# Patient Record
Sex: MALE | Race: WHITE | Employment: FULL TIME | URBAN - NONMETROPOLITAN AREA
[De-identification: names, ages, dates, MRNs, and addresses within clinical notes are randomized per-mention and may not be internally consistent; named-entity substitution may affect disease eponyms.]

---

## 2019-09-10 ENCOUNTER — OFFICE VISIT (OUTPATIENT)
Dept: FAMILY MEDICINE | Facility: OTHER | Age: 59
End: 2019-09-10

## 2019-09-10 VITALS
OXYGEN SATURATION: 97 % | RESPIRATION RATE: 18 BRPM | TEMPERATURE: 98.2 F | WEIGHT: 245.1 LBS | DIASTOLIC BLOOD PRESSURE: 70 MMHG | SYSTOLIC BLOOD PRESSURE: 126 MMHG | HEIGHT: 73 IN | HEART RATE: 48 BPM | BODY MASS INDEX: 32.49 KG/M2

## 2019-09-10 DIAGNOSIS — Z23 VACCINE FOR DIPHTHERIA-TETANUS: ICD-10-CM

## 2019-09-10 DIAGNOSIS — S69.91XA FISH HOOK INJURY OF FINGER OF RIGHT HAND, INITIAL ENCOUNTER: Primary | ICD-10-CM

## 2019-09-10 PROCEDURE — 99202 OFFICE O/P NEW SF 15 MIN: CPT | Mod: 25 | Performed by: NURSE PRACTITIONER

## 2019-09-10 PROCEDURE — 10120 INC&RMVL FB SUBQ TISS SMPL: CPT | Performed by: NURSE PRACTITIONER

## 2019-09-10 PROCEDURE — 90715 TDAP VACCINE 7 YRS/> IM: CPT | Performed by: NURSE PRACTITIONER

## 2019-09-10 PROCEDURE — 90471 IMMUNIZATION ADMIN: CPT | Performed by: NURSE PRACTITIONER

## 2019-09-10 ASSESSMENT — ENCOUNTER SYMPTOMS
JOINT SWELLING: 0
ARTHRALGIAS: 0
WOUND: 1
COLOR CHANGE: 0
CONSTITUTIONAL NEGATIVE: 1
NEUROLOGICAL NEGATIVE: 1
HEMATOLOGIC/LYMPHATIC NEGATIVE: 1

## 2019-09-10 ASSESSMENT — PAIN SCALES - GENERAL: PAINLEVEL: SEVERE PAIN (6)

## 2019-09-10 ASSESSMENT — MIFFLIN-ST. JEOR: SCORE: 1980.65

## 2019-09-10 NOTE — PROGRESS NOTES
"  SUBJECTIVE:   Adolfo Walter is a 58 year old male who presents to clinic today for the following health issues:    HPI  Patient is here on vacation from [facility], [place] until the end of the week.  Has a fishhook in his right thumb which occurred this afternoon, clipped off the lower and attempted to remove it at home but was not able to due to pain.  He has full active range of motion in the thumb, although is painful and swollen due to the fishhook.  Sensation is intact.  Last tetanus was in 2011.  States he has no allergies.  No history of diabetes, no history of heart disease. No daily medications. No recent surgery. Family history, noncontributory. Retired, , nonsmoker.       There are no active problems to display for this patient.    History reviewed. No pertinent past medical history.   History reviewed. No pertinent surgical history.  History reviewed. No pertinent family history.  Social History     Tobacco Use     Smoking status: Never Smoker     Smokeless tobacco: Never Used   Substance Use Topics     Alcohol use: Yes     Comment: occasional     Social History     Social History Narrative     Not on file     No current outpatient medications on file.     No Known Allergies    Review of Systems   Constitutional: Negative.    Musculoskeletal: Negative for arthralgias and joint swelling.   Skin: Positive for wound. Negative for color change and rash.   Allergic/Immunologic: Negative for immunocompromised state.   Neurological: Negative.  Negative for weakness and numbness.   Hematological: Negative.         OBJECTIVE:     /70   Pulse (!) 48   Temp 98.2  F (36.8  C) (Tympanic)   Resp 18   Ht 1.854 m (6' 1\")   Wt 111.2 kg (245 lb 1.6 oz)   SpO2 97%   BMI 32.34 kg/m    Body mass index is 32.34 kg/m .  Physical Exam   Constitutional: He is oriented to person, place, and time. He appears well-developed and well-nourished. No distress.   HENT:   Head: Normocephalic and atraumatic. "   Nose: Nose normal.   Eyes: Conjunctivae are normal. No scleral icterus.   Neck: Normal range of motion.   Cardiovascular: Normal rate.   Pulmonary/Chest: Effort normal.   Musculoskeletal: Normal range of motion.   He has active full range of motion in his left hand, sensation intact distal to the fishhook in his left thumb.   Neurological: He is alert and oriented to person, place, and time.   Skin: Skin is warm. He is not diaphoretic.   [place] embedded into palmar aspect of left thumb.  Minimal swelling, no bleeding.   Psychiatric: He has a normal mood and affect. His behavior is normal. Thought content normal.   Nursing note and vitals reviewed.    Procedure: Fish hook removal  The appropriate timeout was taken. The area was prepped and draped in the usual sterile fashion.   Indication: Reduce risk of infection, reduce bleeding risk.   Location: Fish hook embedded in right thumb   After discussion of risks including infection, bleeding and anesthetic problems, the wound is explored. Verbal consent was obtained before procedure started.  Area was anesthetized with 1.5 mL 1% lidocaine with epi. Small incision made to pad of thumb with 11 blade and hook was pushed through without difficulty.    Estimated blood loss was less than 0.5 mL. Betadine again applied, antibiotic ointment over wound then covered with bandage. CMS intact distally. Pt tolerated procedure well without complication.     Diagnostic Test Results:  No results found for this or any previous visit (from the past 24 hour(s)).    ASSESSMENT/PLAN:       ICD-10-CM    1. Fish hook injury of finger of right hand, initial encounter S69.91XA amoxicillin-clavulanate (AUGMENTIN) 875-125 MG tablet     [facility] IMM-  TDAP VACCINE (BOOSTRIX )   2. Vaccine for diphtheria-tetanus Z23        PLAN:  Fish hook fully removed, pt tolerated well.   Discussed wound care. Keep clean and dry.   Tdap updated. Due to location and dirty injury will cover with Augmentin.   Advised  to monitor for infection - increased redness, pain, drainage, swelling and f/u if concerns develop. Given written instructions.    I explained my diagnostic considerations and recommendations to pt who voiced understanding and agreement with the treatment plan. All questions were answered. We discussed potential side effects of any prescribed or recommended therapies, as well as expectations for response to treatments.    Disclaimer:  This note consists of words and symbols derived from keyboarding, dictation, or using voice recognition software. As a result, there may be errors in the script that have gone undetected. Please consider this when interpreting information found in this note.      JOHN De, NP-C  9/10/2019 at 3:39 PM  [facility]

## 2019-09-10 NOTE — NURSING NOTE
"Chief Complaint   Patient presents with     Musculoskeletal Problem     right thumb     Patient is here with a hook in his right thumb that happened about an hour ago.     Initial /70   Pulse (!) 48   Temp 98.2  F (36.8  C) (Tympanic)   Resp 18   Ht 1.854 m (6' 1\")   Wt 111.2 kg (245 lb 1.6 oz)   SpO2 97%   BMI 32.34 kg/m   Estimated body mass index is 32.34 kg/m  as calculated from the following:    Height as of this encounter: 1.854 m (6' 1\").    Weight as of this encounter: 111.2 kg (245 lb 1.6 oz).  Medication Reconciliation: complete    Sherin Penn LPN  "

## 2019-09-10 NOTE — PATIENT INSTRUCTIONS
Patient Education     Fish Hook Removed  A fish hook has been removed from under your skin. This area may be sore for the next 1 to 2 days. Because this was a dirty puncture-type wound, the risk of infection is higher than normal. Antibiotics may or may not be prescribed depending on various factors such as depth, severity, and location. You may be given a tetanus shot if needed.  Home care  Your healthcare provider will give you instructions on how to care for your wound. These will depend on where the wound is and how serious it is. The following may help you care for your wound at home:    Keep the injured part elevated during the first 2 days. This will help reduce swelling and pain.    Keep the wound clean and dry. If a bandage was applied and it becomes wet or dirty, replace it. Otherwise, leave it in place for the first 24 hours.    Shower as usual, unless your healthcare provider tells you otherwise.    Don't soak in a tub or go swimming for at least 1 week or as instructed by your doctor.    Don't soak the injured area unless your doctor tells you to.    Use acetaminophen or ibuprofen to control pain, unless another pain medicine was prescribed. If you have chronic liver or kidney disease or ever had a stomach ulcer or GI bleeding, talk with your doctor before using these medicines.  Follow-up care  Most puncture wounds heal within 10 days. However, an infection may sometimes occur despite proper treatment. Check the wound daily for the warning signs listed below.  If stitches were used, they should be removed within 7 to 10 days. If a tape closure was used, remove them after 5 days unless told otherwise.  If any X-rays were taken, a radiologist will look at them. You will be notified if there are new findings that may affect your care.  When to seek medical advice  Call your healthcare provider if any of these occur:    Increasing pain in the wound    Redness, swelling or pus coming from the wound    Fever  of 100.4 F (38 C) or higher, or as directed by your healthcare provider  Date Last Reviewed: 10/1/2016    [id number] [employer]. [address] [place] [address], [place] [address]. All rights reserved. This information is not intended as a substitute for professional medical care. Always follow your healthcare professional's instructions.

## 2019-09-20 ASSESSMENT — ENCOUNTER SYMPTOMS
WEAKNESS: 0
NUMBNESS: 0

## (undated) RX ORDER — LIDOCAINE HYDROCHLORIDE 10 MG/ML
INJECTION, SOLUTION EPIDURAL; INFILTRATION; INTRACAUDAL; PERINEURAL
Status: DISPENSED
Start: 2019-09-10

## (undated) RX ORDER — LIDOCAINE HYDROCHLORIDE AND EPINEPHRINE 10; 10 MG/ML; UG/ML
INJECTION, SOLUTION INFILTRATION; PERINEURAL
Status: DISPENSED
Start: 2019-09-10

## (undated) RX ORDER — NEOMYCIN/BACITRACIN/POLYMYXINB 3.5-400-5K
OINTMENT (GRAM) TOPICAL
Status: DISPENSED
Start: 2019-09-10